# Patient Record
Sex: FEMALE | Race: WHITE | NOT HISPANIC OR LATINO | URBAN - METROPOLITAN AREA
[De-identification: names, ages, dates, MRNs, and addresses within clinical notes are randomized per-mention and may not be internally consistent; named-entity substitution may affect disease eponyms.]

---

## 2022-05-30 DIAGNOSIS — R93.89 ABNORMAL CAT SCAN: Primary | ICD-10-CM

## 2022-09-19 ENCOUNTER — OFFICE VISIT (OUTPATIENT)
Dept: GASTROENTEROLOGY | Facility: CLINIC | Age: 42
End: 2022-09-19

## 2022-09-19 VITALS
HEART RATE: 93 BPM | HEIGHT: 66 IN | OXYGEN SATURATION: 99 % | DIASTOLIC BLOOD PRESSURE: 88 MMHG | SYSTOLIC BLOOD PRESSURE: 133 MMHG | BODY MASS INDEX: 32.49 KG/M2 | TEMPERATURE: 98 F | WEIGHT: 202.19 LBS

## 2022-09-19 DIAGNOSIS — K62.5 BRBPR (BRIGHT RED BLOOD PER RECTUM): ICD-10-CM

## 2022-09-19 DIAGNOSIS — R10.31 CHRONIC RLQ PAIN: Primary | ICD-10-CM

## 2022-09-19 DIAGNOSIS — G89.29 CHRONIC RLQ PAIN: Primary | ICD-10-CM

## 2022-09-19 DIAGNOSIS — R93.89 ABNORMAL CAT SCAN: ICD-10-CM

## 2022-09-19 PROCEDURE — 99203 OFFICE O/P NEW LOW 30 MIN: CPT | Mod: S$GLB,,, | Performed by: INTERNAL MEDICINE

## 2022-09-19 PROCEDURE — 99203 PR OFFICE/OUTPT VISIT, NEW, LEVL III, 30-44 MIN: ICD-10-PCS | Mod: S$GLB,,, | Performed by: INTERNAL MEDICINE

## 2022-09-19 RX ORDER — DIETHYLPROPION HYDROCHLORIDE 75 MG/1
75 TABLET, EXTENDED RELEASE ORAL DAILY
COMMUNITY
Start: 2022-09-16

## 2022-09-19 RX ORDER — PHENTERMINE HYDROCHLORIDE 37.5 MG/1
37.5 TABLET ORAL EVERY MORNING
COMMUNITY
Start: 2022-04-18

## 2022-09-19 RX ORDER — ZOLPIDEM TARTRATE 12.5 MG/1
TABLET, FILM COATED, EXTENDED RELEASE ORAL
COMMUNITY
Start: 2022-07-08

## 2022-09-19 RX ORDER — TOPIRAMATE 25 MG/1
25 TABLET ORAL 2 TIMES DAILY
COMMUNITY
Start: 2022-09-16

## 2022-09-19 RX ORDER — SOD SULF/POT CHLORIDE/MAG SULF 1.479 G
12 TABLET ORAL DAILY
Qty: 24 TABLET | Refills: 0 | Status: SHIPPED | OUTPATIENT
Start: 2022-09-19

## 2022-09-19 RX ORDER — SUMATRIPTAN 50 MG/1
TABLET, FILM COATED ORAL
COMMUNITY
Start: 2022-09-06

## 2022-09-19 NOTE — PROGRESS NOTES
Clinic Note    Reason for visit:  The primary encounter diagnosis was Chronic RLQ pain. Diagnoses of BRBPR (bright red blood per rectum), Abnormal CAT scan, and BMI 32.0-32.9,adult were also pertinent to this visit.    PCP: [facility]   [address] / [place] [address]    HPI:  This is a 42 y.o. female who is here to establish care. She reports intermittent episodes of sharp pain in RLQ and infraumbilical area x 2 yrs.  She reports no constipation-she reports mostly soft stools-not watery 3-4 times per day.  She has gained 20 pounds in the past 2 mo.  She had a CT scan showing Thickening in rectum/sigmoid/transverse colon.  She reports a constant urge to urinate and also bowel movements.  Her pelvic U/S was negative.  She denies melena but 2 episodes of BRBPR 2 mo. Ago. She denies a FH of IBD, colon cancer or liver ds.     Patient had CT scan Abd/Pelvis 5/17/2022: Mild nonspecific thickening of the sigmoid colon down to the rectum.  There may be an additional thickened segment of transverse colon. Raising the possibility of a nonspecific inflammatory bowel disease such as Crohn's disease.     Review of Systems   Constitutional:  Negative for chills, diaphoresis, fatigue, fever and unexpected weight change.   HENT:  Negative for mouth sores, nosebleeds, postnasal drip, sore throat, trouble swallowing and voice change.    Eyes:  Negative for pain, discharge and eye dryness.   Respiratory:  Negative for apnea, cough, choking, chest tightness, shortness of breath and wheezing.    Cardiovascular:  Positive for leg swelling. Negative for chest pain, palpitations and claudication.   Gastrointestinal:  Positive for abdominal pain. Negative for abdominal distention, anal bleeding, blood in stool, change in bowel habit, constipation, diarrhea, nausea, rectal pain, vomiting, reflux, fecal incontinence and change in bowel habit.   Genitourinary:  Negative for bladder incontinence, difficulty  urinating, dysuria, flank pain, frequency and hematuria.   Musculoskeletal:  Negative for arthralgias, back pain, joint swelling and joint deformity.   Integumentary:  Negative for color change, rash and wound.   Allergic/Immunologic: Negative for environmental allergies and food allergies.   Neurological:  Negative for seizures, facial asymmetry, speech difficulty, weakness, headaches and memory loss.   Hematological:  Negative for adenopathy. Does not bruise/bleed easily.   Psychiatric/Behavioral:  Negative for agitation, behavioral problems, confusion, hallucinations and sleep disturbance.       Past Medical History:   Diagnosis Date    Insomnia     Migraines     Obesity, unspecified      Past Surgical History:   Procedure Laterality Date    AUGMENTATION OF BREAST      CHOLECYSTECTOMY      COLONOSCOPY      GASTRIC BYPASS  2010    KNEE SURGERY Left     SINUS SURGERY      TONSILLECTOMY AND ADENOIDECTOMY       Family History   Problem Relation Age of Onset    Breast cancer Paternal Grandmother      Social History     Tobacco Use    Smoking status: Never    Smokeless tobacco: Never   Substance Use Topics    Alcohol use: Yes     Comment: occasional    Drug use: Never     Review of patient's allergies indicates:  No Known Allergies     Medication List with Changes/Refills   New Medications    SOD SULF-POT CHLORIDE-MAG SULF (SUTAB) 1.479-0.188- 0.225 GRAM TABLET    Take 12 tablets by mouth once daily. Take according to package instructions with indicated amount of water. No breakfast day before test. May substitute with Suprep, Clenpiq, Plenvu, Moviprep or GoLytely based on Rx plan and patient preference.   Current Medications    DIETHYLPROPION (TENUATE) 75 MG SR TABLET    Take 75 mg by mouth once daily.    PHENTERMINE (ADIPEX-P) 37.5 MG TABLET    Take 37.5 mg by mouth every morning.    SUMATRIPTAN (IMITREX) 50 MG TABLET    TAKE ONE TABLET BY MOUTH AT ONSET OF MIGRAINE AND REPEAT IN 2 HOURS IF NEEDED. MAX OF 4 TABLETS  "PER DAY.    TOPIRAMATE (TOPAMAX) 25 MG TABLET    Take 25 mg by mouth 2 (two) times daily.    ZOLPIDEM (AMBIEN CR) 12.5 MG CR TABLET    TAKE 1 TABLET BY MOUTH ONCE DAILY AT BEDTIME AS NEEDED         Vital Signs:  /88   Pulse 93   Temp 98.1 °F (36.7 °C)   Ht 5' 6" (1.676 m)   Wt 91.7 kg (202 lb 3.2 oz)   SpO2 99%   BMI 32.64 kg/m²         Physical Exam  Vitals reviewed.   Constitutional:       General: She is awake. She is not in acute distress.     Appearance: Normal appearance. She is well-developed. She is not ill-appearing, toxic-appearing or diaphoretic.   HENT:      Head: Normocephalic and atraumatic.      Nose: Nose normal.      Mouth/Throat:      Mouth: Mucous membranes are moist.      Pharynx: Oropharynx is clear. No oropharyngeal exudate or posterior oropharyngeal erythema.   Eyes:      General: Lids are normal. Gaze aligned appropriately. No scleral icterus.        Right eye: No discharge.         Left eye: No discharge.      Extraocular Movements: Extraocular movements intact.      Conjunctiva/sclera: Conjunctivae normal.   Neck:      Trachea: Trachea normal.   Cardiovascular:      Rate and Rhythm: Normal rate and regular rhythm.      Pulses:           Radial pulses are 2+ on the right side and 2+ on the left side.   Pulmonary:      Effort: Pulmonary effort is normal. No respiratory distress.      Breath sounds: Normal breath sounds. No stridor. No wheezing or rhonchi.   Chest:      Chest wall: No tenderness.   Abdominal:      General: Bowel sounds are normal. There is no distension.      Palpations: Abdomen is soft. There is no fluid wave, hepatomegaly or mass.      Tenderness: There is abdominal tenderness in the right lower quadrant. There is no guarding or rebound.      Comments: Mild infraumbilical pain   Musculoskeletal:         General: No tenderness or deformity.      Cervical back: Full passive range of motion without pain and neck supple. No tenderness.      Right lower leg: No edema. "      Left lower leg: No edema.   Lymphadenopathy:      Cervical: No cervical adenopathy.   Skin:     General: Skin is warm and dry.      Capillary Refill: Capillary refill takes less than 2 seconds.      Coloration: Skin is not cyanotic, jaundiced or pale.      Findings: No rash.   Neurological:      General: No focal deficit present.      Mental Status: She is alert and oriented to person, place, and time.      Cranial Nerves: No facial asymmetry.      Motor: No tremor.   Psychiatric:         Attention and Perception: Attention normal.         Mood and Affect: Mood and affect normal.         Speech: Speech normal.         Behavior: Behavior normal. Behavior is cooperative.          All of the data above and below has been reviewed by myself and any further interpretations will be reflected in the assessment and plan.   The data includes review of external notes, and independent interpretation of lab results, procedures, x-rays, and imaging reports.      Assessment:  Chronic RLQ pain  -     Ambulatory Referral to External Surgery    BRBPR (bright red blood per rectum)  -     Ambulatory Referral to External Surgery    Abnormal CAT scan  -     Ambulatory referral/consult to Gastroenterology  -     Ambulatory Referral to External Surgery    BMI 32.0-32.9,adult    Other orders  -     sod sulf-pot chloride-mag sulf (SUTAB) 1.479-0.188- 0.225 gram tablet; Take 12 tablets by mouth once daily. Take according to package instructions with indicated amount of water. No breakfast day before test. May substitute with Suprep, Clenpiq, Plenvu, Moviprep or GoLytely based on Rx plan and patient preference.  Dispense: 24 tablet; Refill: 0    Plan for whole colon biopsies to rule out microscopic colitis.     Recommendations:  Colon at [facility] w/SUTAB    Patient screened for and received weight management and nutritional counseling regarding BMI of less than 18 or greater than 25.      Risks, benefits, and alternatives of medical  management, any associated procedures, and/or treatment discussed with the patient. Patient given opportunity to ask questions and voices understanding. Patient has elected to proceed with the recommended care modalities as discussed.    Follow up in about 4 months (around 1/19/2023).    Order summary:  Orders Placed This Encounter   Procedures    Ambulatory Referral to External Surgery          Instructed patient to notify my office if they have not been contacted within two weeks after any procedures, submitting any samples (biopsies, blood, stool, urine, etc.) or after any imaging (X-ray, CT, MRI, etc.).     Arron Morse MD    This document may have been created using a voice recognition transcribing system. Incorrect words or phrases may have been missed during proofreading. Please interpret accordingly or contact me for clarification.

## 2022-09-19 NOTE — LETTER
September 19, 2022          No Recipients             [facility]  [address] [address]  Phone: [phone]  Fax: [phone]   Patient: Lizette Rice   MR Number: [id number]   YOB: 1980   Date of Visit: 9/19/2022       Dear Dr. Esparza Recipients:    Thank you for referring Lizette Rice to me for evaluation. Attached you will find relevant portions of my assessment and plan of care.    If you have questions, please do not hesitate to call me. I look forward to following Lizette Rice along with you.    Sincerely,      Arron Morse MD            CC    No Recipients    Enclosure

## 2022-09-19 NOTE — PATIENT INSTRUCTIONS
Please notify my office if you have not been contacted within two weeks after any procedures, submitting any samples (biopsies, blood, stool, urine, etc.) or after any imaging (X-ray, CT, MRI, etc.).     Colon at [facility] w/SUTAB

## 2022-10-05 ENCOUNTER — TELEPHONE (OUTPATIENT)
Dept: ADMINISTRATIVE | Facility: CLINIC | Age: 42
End: 2022-10-05

## 2022-10-17 ENCOUNTER — TELEPHONE (OUTPATIENT)
Dept: GASTROENTEROLOGY | Facility: CLINIC | Age: 42
End: 2022-10-17

## 2022-10-17 DIAGNOSIS — K62.5 BRBPR (BRIGHT RED BLOOD PER RECTUM): ICD-10-CM

## 2022-10-17 DIAGNOSIS — R93.89 ABNORMAL CAT SCAN: ICD-10-CM

## 2022-10-17 DIAGNOSIS — G89.29 CHRONIC RLQ PAIN: Primary | ICD-10-CM

## 2022-10-17 DIAGNOSIS — R10.31 CHRONIC RLQ PAIN: Primary | ICD-10-CM

## 2022-10-17 NOTE — TELEPHONE ENCOUNTER
Returned pt call to reschedule colonoscopy. Pt requested to have procedure at [facility], since she is self-pay and heard [facility] work with you on the cost. I told her that I would ask [facility], if the procedure could be there vs [facility] and contact her back. wiley

## 2022-10-17 NOTE — TELEPHONE ENCOUNTER
----- Message from Salud Stokes sent at 10/17/2022 10:55 AM CDT -----  Regarding: Appt access  Contact: pt  Pt is calling to reschedule her colonoscopy with Dr Morse. Please call back at [phone]//thank you acc

## 2022-10-18 NOTE — TELEPHONE ENCOUNTER
Returned pt call and told her what [facility] stated. Pt mentioned that [place] has [place] [facility] [place] Plus, has done her own research and knows the price will be higher at [facility], but they also offer payment plans as well as assistance and could possibly pay the whole amount.     Told her that I would relay this message back to [facility] and get back with her. wiley

## 2022-10-20 VITALS — WEIGHT: 202 LBS | BODY MASS INDEX: 32.47 KG/M2 | HEIGHT: 66 IN

## 2022-10-20 NOTE — TELEPHONE ENCOUNTER
"[facility]  [address] [address]  Phone: [phone]  Fax: [phone]    History & Physical         Provider: Dr. Arron Morse    Patient Name: Lizette LYNCH (age):1980  42 y.o.           Gender: female   Phone: [phone]     Referring Physician: [facility]     Vital Signs:   Height - 5' 6"  Weight - 202 lbs  BMI -  32.60    Plan: Colonoscopy     Encounter Diagnoses   Name Primary?    Chronic RLQ pain Yes    BRBPR (bright red blood per rectum)     Abnormal CAT scan            History:      Past Medical History:   Diagnosis Date    Insomnia     Migraines     Obesity, unspecified       Past Surgical History:   Procedure Laterality Date    AUGMENTATION OF BREAST      CHOLECYSTECTOMY      COLONOSCOPY      GASTRIC BYPASS  2010    KNEE SURGERY Left     SINUS SURGERY      TONSILLECTOMY AND ADENOIDECTOMY        Medication List with Changes/Refills   Current Medications    DIETHYLPROPION (TENUATE) 75 MG SR TABLET    Take 75 mg by mouth once daily.    PHENTERMINE (ADIPEX-P) 37.5 MG TABLET    Take 37.5 mg by mouth every morning.    SOD SULF-POT CHLORIDE-MAG SULF (SUTAB) 1.479-0.188- 0.225 GRAM TABLET    Take 12 tablets by mouth once daily. Take according to package instructions with indicated amount of water. No breakfast day before test. May substitute with Suprep, Clenpiq, Plenvu, Moviprep or GoLytely based on Rx plan and patient preference.    SUMATRIPTAN (IMITREX) 50 MG TABLET    TAKE ONE TABLET BY MOUTH AT ONSET OF MIGRAINE AND REPEAT IN 2 HOURS IF NEEDED. MAX OF 4 TABLETS PER DAY.    TOPIRAMATE (TOPAMAX) 25 MG TABLET    Take 25 mg by mouth 2 (two) times daily.    ZOLPIDEM (AMBIEN CR) 12.5 MG CR TABLET    TAKE 1 TABLET BY MOUTH ONCE DAILY AT BEDTIME AS NEEDED      Review of patient's allergies indicates:  No Known Allergies   Family History   Problem Relation Age of Onset "    Breast cancer Paternal Grandmother       Social History     Tobacco Use    Smoking status: Never    Smokeless tobacco: Never   Substance Use Topics    Alcohol use: Yes     Comment: occasional    Drug use: Never        Physical Examination:     General Appearance:___________________________  HEENT: _____________________________________  Abdomen:____________________________________  Heart:________________________________________  Lungs:_______________________________________  Extremities:___________________________________  Skin:_________________________________________  Endocrine:____________________________________  Genitourinary:_________________________________  Neurological:__________________________________      Patient has been evaluated immediately prior to sedation and is medically cleared for endoscopy with IVCS as an ASA class: ______      Physician Signature: _________________________       Date: ________  Time: ________

## 2022-10-20 NOTE — TELEPHONE ENCOUNTER
Called pt and scheduled her colonoscopy @ [facility] on 11/10/22. Verified that her medical chart has not changed since, M last reviewed it. Also, verified that pt has prep instructions & laxatives. Jose      Told pt to hold phentermine for 7 days prior to the procedure. Pt understood. wiley

## 2022-11-10 ENCOUNTER — OUTSIDE PLACE OF SERVICE (OUTPATIENT)
Dept: GASTROENTEROLOGY | Facility: CLINIC | Age: 42
End: 2022-11-10

## 2022-11-10 LAB — CRC RECOMMENDATION EXT: NORMAL

## 2022-11-10 PROCEDURE — 45380 PR COLONOSCOPY,BIOPSY: ICD-10-PCS | Mod: ,,, | Performed by: INTERNAL MEDICINE

## 2022-11-10 PROCEDURE — 45380 COLONOSCOPY AND BIOPSY: CPT | Mod: ,,, | Performed by: INTERNAL MEDICINE

## 2022-11-11 LAB — SPECIMEN TO PATHOLOGY: NORMAL

## 2022-11-14 NOTE — PROGRESS NOTES
Call with results, bx's of colon were NORMAL-if still with diarrhea we can send out WELCHOL BID-warn patient about not taking this medication 2 hours from other medications-let me know if wants to try this med.

## 2022-11-17 RX ORDER — COLESEVELAM 180 1/1
625 TABLET ORAL 2 TIMES DAILY WITH MEALS
Qty: 60 TABLET | Refills: 3 | Status: SHIPPED | OUTPATIENT
Start: 2022-11-17

## 2022-11-17 NOTE — TELEPHONE ENCOUNTER
Called patient with results of biopsy. Patient still having diarrhea and abdominal discomfort would like to try welchol. Instructed patient to take 2 hours apart from any other medication. Patient verbalized understanding. Please see attached prescription.

## 2022-11-17 NOTE — TELEPHONE ENCOUNTER
----- Message from Arron Morse MD sent at 11/14/2022 11:58 AM CST -----  Call with results, bx's of colon were NORMAL-if still with diarrhea we can send out WELCHOL BID-warn patient about not taking this medication 2 hours from other medications-let me know if wants to try this med.

## 2022-12-16 ENCOUNTER — DOCUMENTATION ONLY (OUTPATIENT)
Dept: GASTROENTEROLOGY | Facility: CLINIC | Age: 42
End: 2022-12-16

## 2024-06-20 ENCOUNTER — TELEPHONE (OUTPATIENT)
Dept: GASTROENTEROLOGY | Facility: CLINIC | Age: 44
End: 2024-06-20
Payer: COMMERCIAL

## 2024-06-20 NOTE — TELEPHONE ENCOUNTER
"Spoke to pt. Former [facility] pt. She is having what she describes as sharp rectal pain. States it feels like someone is "sticking a knife up there and turning it". She does have a hx of hemorrhoids but it is not her normal hemorrhoid pain. She has spoken to her PCP at [facility] in [place] and has had labs, stool testing, and a CT completed. So far she has been told everything has returned normal. They are still awaiting FIT test results. She does experience some nausea as well. Nothing in  particular makes it better/worse but she does have a little bit of relief when soaking in warm water. She has also been experiencing around 5 loose stools a day. Please advise. Will request records from PCP. -KG   "

## 2024-06-20 NOTE — TELEPHONE ENCOUNTER
----- Message from Charo Hernandez sent at 6/20/2024  2:39 PM CDT -----  Contact: self  Type:  Sooner Apoointment Request    Caller is requesting a sooner appointment.  Caller declined first available appointment listed below.  Caller will not accept being placed on the waitlist and is requesting a message be sent to doctor.  Name of Caller:Lizette Rice  When is the first available appointment?08/2024  Symptoms:rectal pain  Would the patient rather a call back or a response via [facility]?   Best Call Back Number:[phone]  Additional Information: different from hemorrhoid pain,difficulty sitting, standing

## 2024-06-21 NOTE — TELEPHONE ENCOUNTER
Needs perianal exam to evaluate for anal fissure. May need colorectal referral for anoscopy. Has been 1.5 years since GI evaluation.     Notify patient of [facility]'s care home. Okay to add on to NP clinic schedule if patient wishes to transition her GI care to me.  NBP